# Patient Record
Sex: FEMALE | Race: WHITE | Employment: OTHER | URBAN - METROPOLITAN AREA
[De-identification: names, ages, dates, MRNs, and addresses within clinical notes are randomized per-mention and may not be internally consistent; named-entity substitution may affect disease eponyms.]

---

## 2018-04-17 ENCOUNTER — TELEPHONE (OUTPATIENT)
Dept: ORTHOPEDIC SURGERY | Age: 53
End: 2018-04-17
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
## 2018-04-17 PROBLEM — S61.459A CAT BITE OF HAND, INITIAL ENCOUNTER: Status: ACTIVE | Noted: 2018-04-17

## 2018-05-01 ENCOUNTER — OFFICE VISIT (OUTPATIENT)
Dept: ORTHOPEDIC SURGERY | Age: 53
End: 2018-05-01

## 2018-05-01 VITALS — BODY MASS INDEX: 18.44 KG/M2 | HEIGHT: 64 IN | WEIGHT: 108 LBS
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
## 2018-05-01 DIAGNOSIS — L03.113 CELLULITIS OF RIGHT UPPER EXTREMITY: Primary | ICD-10-CM

## 2018-05-01 DIAGNOSIS — S61.459A CAT BITE OF HAND, INITIAL ENCOUNTER: ICD-10-CM

## 2018-05-01 PROCEDURE — 99024 POSTOP FOLLOW-UP VISIT: CPT | Performed by: ORTHOPAEDIC SURGERY

## 2022-06-16 ENCOUNTER — HOSPITAL ENCOUNTER (OUTPATIENT)
Age: 57
Discharge: HOME | End: 2022-06-16
Payer: MEDICAID

## 2022-06-16 ENCOUNTER — HOSPITAL ENCOUNTER (OUTPATIENT)
Dept: GENERAL RADIOLOGY | Age: 57
Discharge: HOME | End: 2022-06-16
Payer: MEDICAID

## 2022-06-16 DIAGNOSIS — M79.602 LEFT ARM PAIN: ICD-10-CM

## 2022-06-16 PROCEDURE — 73090 X-RAY EXAM OF FOREARM: CPT

## 2022-06-21 ENCOUNTER — OFFICE VISIT (OUTPATIENT)
Dept: ORTHOPEDIC SURGERY | Age: 57
End: 2022-06-21
Payer: MEDICAID

## 2022-06-21 VITALS — WEIGHT: 108 LBS | BODY MASS INDEX: 18.44 KG/M2 | RESPIRATION RATE: 16 BRPM | HEIGHT: 64 IN

## 2022-06-21 DIAGNOSIS — S52.225K: ICD-10-CM

## 2022-06-21 DIAGNOSIS — S49.92XA ARM INJURY, LEFT, INITIAL ENCOUNTER: Primary | ICD-10-CM

## 2022-06-21 PROCEDURE — G8427 DOCREV CUR MEDS BY ELIG CLIN: HCPCS | Performed by: PHYSICIAN ASSISTANT

## 2022-06-21 PROCEDURE — G8420 CALC BMI NORM PARAMETERS: HCPCS | Performed by: PHYSICIAN ASSISTANT

## 2022-06-21 PROCEDURE — L3908 WHO COCK-UP NONMOLDE PRE OTS: HCPCS | Performed by: PHYSICIAN ASSISTANT

## 2022-06-21 PROCEDURE — 3017F COLORECTAL CA SCREEN DOC REV: CPT | Performed by: PHYSICIAN ASSISTANT

## 2022-06-21 PROCEDURE — 99202 OFFICE O/P NEW SF 15 MIN: CPT | Performed by: PHYSICIAN ASSISTANT

## 2022-06-21 PROCEDURE — 4004F PT TOBACCO SCREEN RCVD TLK: CPT | Performed by: PHYSICIAN ASSISTANT

## 2022-06-22 PROBLEM — S49.92XA ARM INJURY, LEFT, INITIAL ENCOUNTER: Status: ACTIVE | Noted: 2022-06-22

## 2022-06-22 PROBLEM — S52.226K: Status: ACTIVE | Noted: 2022-06-22

## 2022-06-22 NOTE — PROGRESS NOTES
Subjective:      Patient ID: Abraham Malone is a 62 y.o. female who presents to the office for an initial evaluation of left forearm pain. She states she sustained a fracture to the left ulna approximately 9 years ago. She was treated with a cast.  She was told her fracture had healed and she was doing fine. 3 weeks ago struck her forearm against a countertop and is now experiencing pain in the mid forearm region. 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 She is here today for further orthopedic evaluation. She is accompanied today with her medical . Pain Scale 9/10 VAS. Location of pain mid shaft left forearm. Pain is worse with activity. Pain improves with rest.   Previous treatments have included Tylenol. Review Of Systems:   Review of Systems:  I have reviewed the clinically relevant past medical history, medications, allergies, family history, social history, and 13 point Review of Systems from the patient's recent history form & documented any details relevant to today's presenting complaints in the history above. The patient's self-reported past medical history, medications, allergies, family history, social history, and Review of Systems form from today's date have been scanned into the chart under the \"Media\" tab. As noted in the HPI. Negative for fever or chills. Negative for poly-joint pain, swelling and stiffness. She denies numbness or tingling into the left upper extremity. Past Medical History:   Diagnosis Date    Depression     Schizophrenia [facility])        History reviewed. No pertinent family history.     Past Surgical History:   Procedure Laterality Date    ABSCESS DRAINAGE      Rt. thumb       Social History     Occupational History    Not on file   Tobacco Use    Smoking status: Current Every Day Smoker     Packs/day: 1.00     Types: Cigarettes    Smokeless tobacco: Never Used   Substance and Sexual Activity    Alcohol use: No    Drug use: No    Sexual activity: Not on file       Current Outpatient Medications   Medication Sig Dispense Refill    acetaminophen (TYLENOL) 325 MG tablet Take 2 tablets by mouth every 6 hours as needed for Pain 120 tablet 3    traZODone (DESYREL) 100 MG tablet Take 100 mg by mouth nightly      fluPHENAZine HCl (PROLIXIN) 10 MG tablet Take 10 mg by mouth nightly       trihexyphenidyl (ARTANE) 5 MG tablet Take 5 mg by mouth 2 times daily.  OLANZapine (ZYPREXA) 15 MG tablet Take 30 mg by mouth nightly        No current facility-administered medications for this visit. Objective:     She is alert, oriented x 3, pleasant, well nourished, developed and in no   acute distress. She appears very unkept with dirty hands and feet. Resp 16   Ht 5' 4\" (1.626 m)   Wt 108 lb (49 kg)   BMI 18.54 kg/m²      Examination of the left forearm:  Skin is intact. She has a lump over the midshaft of the ulna. Assumed to be callus formation. She has tenderness with palpation over this region. She has discomfort in the mid forearm with pronation and supination of the wrist.  She has near full range of motion of the elbow and wrist.      Upper extremities:  She has 5/5 strength of her interosseous muscles, wrist dorsiflexors and volarflexors, biceps, triceps, deltoids, and internal and external rotators of her shoulders, bilaterally. Her biceps, triceps, bracheoradialis, quadriceps and achilles reflexes are 2+, bilaterally. Sensation is intact to light touchfrom C6 to C8. She has no clonus and negative Greene's bilaterally. Examination of the upper extremities shows intact perfusion to all extremities. She has no cyanosis and digigts are warm to touch, capillary refill is less than 2 seconds. She has no edema noted. She has intact skin without lacerations or abrasions, no significant erythema, rashes or skin lesions.              X Rays: were not performed in the office today:   X Rays from [facility] or an outside facility:  Narrative   EXAMINATION:   TWO XRAY VIEWS OF THE LEFT FOREARM       6/16/2022 9:16 am       COMPARISON:   None.       HISTORY:   ORDERING SYSTEM PROVIDED HISTORY: Left arm pain   TECHNOLOGIST PROVIDED HISTORY:   Reason for Exam: left arm pain       FINDINGS:   There is a fracture seen through the mid shaft of the ulna with mild   angulation, and callus formation, but no significant bony bridging.       Degenerative changes seen at the elbow joint, and the trochlear ulnar joint,   and radiocapitellar joint.  There is a small joint effusion.           Impression   Incompletely healed fracture of the ulna.  This could represent nonunion as   there is no significant bony bridging. Rocael Ra  Correlate with time course of injury.       Small joint effusion is seen at the elbow joint.  This may be related to the   degenerative change at the elbow joint rather than indicative of underlying   occult fracture.             Additional Tests reviewed: none  Additional Outside Records reviewed: none    Diagnosis:       ICD-10-CM    1. Arm injury, left, initial encounter  S49. 92XA Amanda Chavez Titan Wrist and Forearm Brace   2. 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 X-rays dated 6/16/2022 appear to be a nonunion of the midshaft transverse fracture of the ulna. Wrist and elbow joint appears stable. I had an extensive discussion with Ms. Vladimir Cortez regarding the natural history, etiology, and long term consequences of her condition. I have presented reasonable alternatives to the patient's proposed care, treatment, and services. Risks and benefits of the treatment options also reviewed in detail. I have outlined a treatment plan with them. She has had full opportunity to ask her questions.   I have answered them all to her satisfaction. I feel that Ms. Nara Tineo understands our discussion today. Discussed discussed surgical and nonsurgical options today. Plan:  Medications-Tylenol for pain    Procedures-we will treat in a long forearm splint. Give this 3 weeks to see if her pain subsides. Procedures    Amanda Chavez Titan Wrist and Forearm Brace     Patient was prescribed a Amanda Chavez Wrist and Forearm Brace. The left hand will require stabilization / immobilization from this semi-rigid / rigid orthosis to improve their function. The orthosis will assist in protecting the affected area, provide functional support and facilitate healing. The patient was educated and fit by a healthcare professional with expert knowledge and specialization in brace application while under the direct supervision of the physician. Verbal and written instructions for the use of and application of this item were provided. They were instructed to contact the office immediately should the brace result in increased pain, decreased sensation, increased swelling or worsening of the condition. Follow up- 3 weeks if still symptomatic. Call or return to clinic if these symptoms worsen or fail to improve as anticipated. The total time spent on today's visit including reviewing test results, history, performance of physical exam, counseling/ education, ordering of medications, tests or procedures was 18 minutes. This time does not include completion of the medical record. This time excludes any time spent performing procedures or tests in the office. Aixa Childress PA-C   Senior Physician Assistant   [facility]                                         Disclaimer: This note was generated with use of a verbal recognition program (DRAGON) and an attempt was made to check for errors.   It is possible that there are still dictated errors within this office note. If so, please bring any significant errors to my attention for an addendum. All efforts were made to ensure that this office note is accurate.

## 2022-07-13 ENCOUNTER — HOSPITAL ENCOUNTER (EMERGENCY)
Age: 57
Discharge: HOME | End: 2022-07-13
Payer: MEDICAID

## 2022-07-13 ENCOUNTER — OFFICE VISIT (OUTPATIENT)
Dept: ORTHOPEDIC SURGERY | Age: 57
End: 2022-07-13

## 2022-07-13 VITALS — BODY MASS INDEX: 18.44 KG/M2 | WEIGHT: 108 LBS | RESPIRATION RATE: 16 BRPM | HEIGHT: 64 IN

## 2022-07-13 VITALS
RESPIRATION RATE: 16 BRPM | HEIGHT: 64 IN | DIASTOLIC BLOOD PRESSURE: 70 MMHG | TEMPERATURE: 97.8 F | WEIGHT: 120 LBS | SYSTOLIC BLOOD PRESSURE: 107 MMHG | BODY MASS INDEX: 20.49 KG/M2 | HEART RATE: 86 BPM | OXYGEN SATURATION: 94 %

## 2022-07-13 DIAGNOSIS — S52.225K: Primary | ICD-10-CM

## 2022-07-13 DIAGNOSIS — L03.211 FACIAL CELLULITIS: ICD-10-CM

## 2022-07-13 DIAGNOSIS — K08.89 PAIN, DENTAL: Primary | ICD-10-CM

## 2022-07-13 PROCEDURE — 99284 EMERGENCY DEPT VISIT MOD MDM: CPT

## 2022-07-13 PROCEDURE — 99212 OFFICE O/P EST SF 10 MIN: CPT | Performed by: PHYSICIAN ASSISTANT

## 2022-07-13 PROCEDURE — 3017F COLORECTAL CA SCREEN DOC REV: CPT | Performed by: PHYSICIAN ASSISTANT

## 2022-07-13 PROCEDURE — 4004F PT TOBACCO SCREEN RCVD TLK: CPT | Performed by: PHYSICIAN ASSISTANT

## 2022-07-13 PROCEDURE — 2500000003 HC RX 250 WO HCPCS: Performed by: PHYSICIAN ASSISTANT

## 2022-07-13 PROCEDURE — G8427 DOCREV CUR MEDS BY ELIG CLIN: HCPCS | Performed by: PHYSICIAN ASSISTANT

## 2022-07-13 PROCEDURE — 96372 THER/PROPH/DIAG INJ SC/IM: CPT

## 2022-07-13 PROCEDURE — G8420 CALC BMI NORM PARAMETERS: HCPCS | Performed by: PHYSICIAN ASSISTANT

## 2022-07-13 PROCEDURE — 6370000000 HC RX 637 (ALT 250 FOR IP): Performed by: PHYSICIAN ASSISTANT

## 2022-07-13 RX ORDER — CLINDAMYCIN PHOSPHATE 150 MG/ML
600 INJECTION, SOLUTION INTRAVENOUS ONCE
Status: COMPLETED | OUTPATIENT
Start: 2022-07-13 | End: 2022-07-13

## 2022-07-13 RX ORDER — IBUPROFEN 600 MG/1
600 TABLET ORAL EVERY 6 HOURS PRN
Qty: 120 TABLET | Refills: 0 | Status: SHIPPED | OUTPATIENT
Start: 2022-07-13

## 2022-07-13 RX ORDER — CLINDAMYCIN HYDROCHLORIDE 300 MG/1
300 CAPSULE ORAL 3 TIMES DAILY
Qty: 21 CAPSULE | Refills: 0 | Status: SHIPPED | OUTPATIENT
Start: 2022-07-13 | End: 2022-07-20

## 2022-07-13 RX ADMIN — CLINDAMYCIN PHOSPHATE 600 MG: 150 INJECTION, SOLUTION INTRAMUSCULAR; INTRAVENOUS at 11:57

## 2022-07-13 RX ADMIN — IBUPROFEN 600 MG: 200 TABLET, FILM COATED ORAL at 11:23

## 2022-07-13 ASSESSMENT — PAIN DESCRIPTION - PAIN TYPE: TYPE: ACUTE PAIN

## 2022-07-13 ASSESSMENT — PAIN DESCRIPTION - ONSET: ONSET: ON-GOING

## 2022-07-13 ASSESSMENT — PAIN DESCRIPTION - LOCATION
LOCATION: MOUTH
LOCATION: MOUTH

## 2022-07-13 ASSESSMENT — PAIN DESCRIPTION - FREQUENCY: FREQUENCY: CONTINUOUS

## 2022-07-13 ASSESSMENT — PAIN SCALES - GENERAL
PAINLEVEL_OUTOF10: 7
PAINLEVEL_OUTOF10: 7

## 2022-07-13 ASSESSMENT — PAIN DESCRIPTION - DESCRIPTORS
DESCRIPTORS: ACHING
DESCRIPTORS: ACHING

## 2022-07-13 ASSESSMENT — PAIN DESCRIPTION - ORIENTATION
ORIENTATION: RIGHT
ORIENTATION: RIGHT

## 2022-07-13 ASSESSMENT — PAIN - FUNCTIONAL ASSESSMENT
PAIN_FUNCTIONAL_ASSESSMENT: 0-10
PAIN_FUNCTIONAL_ASSESSMENT: ACTIVITIES ARE NOT PREVENTED

## 2022-07-13 NOTE — PROGRESS NOTES
Subjective:      Patient ID: Melissa Flor is a 62 y.o. 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 History of remote forearm fracture when she was 5years old. Treated in cast.      Approximately 7 weeks ago struck her arm against a countertop and developed increased pain. X-rays on 6/16/2022 demonstrated nonunion of her previous ulnar fracture. Since 6/21/2022 she has been wearing long forearm splint and reports significant improvement of her left forearm pain. Review Of Systems:   Negative for fever or chills. Past Medical History:   Diagnosis Date    Depression     Schizophrenia [facility])        History reviewed. No pertinent family history. Past Surgical History:   Procedure Laterality Date    ABSCESS DRAINAGE      Rt. thumb       Social History     Occupational History    Not on file   Tobacco Use    Smoking status: Current Every Day Smoker     Packs/day: 1.00     Types: Cigarettes    Smokeless tobacco: Never Used   Substance and Sexual Activity    Alcohol use: No    Drug use: No    Sexual activity: Not on file       Current Outpatient Medications   Medication Sig Dispense Refill    acetaminophen (TYLENOL) 325 MG tablet Take 2 tablets by mouth every 6 hours as needed for Pain 120 tablet 3    traZODone (DESYREL) 100 MG tablet Take 100 mg by mouth nightly      fluPHENAZine HCl (PROLIXIN) 10 MG tablet Take 10 mg by mouth nightly       trihexyphenidyl (ARTANE) 5 MG tablet Take 5 mg by mouth 2 times daily.  OLANZapine (ZYPREXA) 15 MG tablet Take 30 mg by mouth nightly        No current facility-administered medications for this visit. Objective:     She is alert, oriented x 3, pleasant, well nourished, developed and in no   acute distress. Resp 16   Ht 5' 4\" (1.626 m)   Wt 108 lb (49 kg)   BMI 18.54 kg/m²      She has minimal tenderness with palpation over the mid forearm.       X Rays: performed in the office today: AP and lateral radius and ulna shows what appears to be a healing fracture of a previous nonunion mid ulna. No angulation or displacement. Diagnosis:       ICD-10-CM    1. 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 I had an extensive discussion with Ms. Sarah Medina regarding the natural history, etiology, and long term consequences of her condition. I have presented reasonable alternatives to the patient's proposed care, treatment, and services. Risks and benefits of the treatment options also reviewed in detail. I have outlined a treatment plan with them. She has had full opportunity to ask her questions. I have answered them all to her satisfaction. I feel that Ms. Sarah Medina understands our discussion today. Plan:    Procedures-recommend she continue wearing a long forearm splint for an additional 4 weeks. Rest, Ice, Compression and Elevation  Activity restriction/ Modification discussed. OTC NSAIDS discussed. 1. The most common side effects from NSAIDs are stomachaches, heartburn, and nausea. NSAIDs may irritate the stomach lining. If the medicine upsets your stomach, you can try taking it with food. But if that doesn't help, talk with your doctor to make sure it's not a more serious problem, such as a stomach ulcer or bleeding in the stomach or intestines. 2. Using NSAIDs may:  ? Lead to high blood pressure. ? Make symptoms of heart failure worse. ? Raise the risk of heart attack, stroke, kidney damage, and skin reactions. 3. Your risks are greater if you take NSAIDs at higher doses or for longer than the label says. People who are older than 72 or who have heart, stomach, or intestinal disease have a higher risk for problems. Follow [id number] in 4 weeks.   Call or return to clinic if these symptoms worsen or fail to improve as anticipated. The total time spent on today's visit including reviewing test results, history, performance of physical exam, counseling/ education, ordering of medications, tests or procedures was 12 minutes. This time does include completion of the medical record. This time excludes any time spent performing procedures or tests in the office. Franky Whitmore PA-C   Senior Physician Assistant   [facility]                                         Disclaimer: This note was generated with use of a verbal recognition program (DRAGON) and an attempt was made to check for errors. It is possible that there are still dictated errors within this office note. If so, please bring any significant errors to my attention for an addendum. All efforts were made to ensure that this office note is accurate.

## 2022-07-13 NOTE — ED PROVIDER NOTES
**ADVANCED PRACTICE PROVIDER, I HAVE EVALUATED THIS PATIENT**        [address] [facility]3] ENCOUNTER      Pt Name: Jacque WHITAKER:[id number]  [place] 1965  Date of evaluation: 7/13/2022  Provider: Jonelle Valdez PA-C      Chief Complaint:    Chief Complaint   Patient presents with    Dental Pain     patient has swelling to Rt side of her mouth for 2 days          Nursing Notes, Past Medical Hx, Past Surgical Hx, Social Hx, Allergies, and Family Hx were all reviewed and agreed with or any disagreements were addressed in the HPI.    HPI: (Location, Duration, Timing, Severity, Quality, Assoc Sx, Context, Modifying factors)    Chief Complaint of dental pain    This is a  62 y.o. female who presents stating that she has some pain and tenderness in the right lower jaw around some infected teeth. She states that she knows she has a couple of teeth there that are bad and they do hurt. She has noticed that things have gotten worse over the last couple of days. She has not seen a dentist about this. Patient states that she given for help. Pain is local and constant in that face to jaw area, worse with pressure on the affected teeth and better at rest.  No difficulty swallowing or breathing. No fevers or chills cough or congestion. No other acute complaint according to patient. No medication taken for this so far today.     PastMedical/Surgical History:      Diagnosis Date    Depression     Schizophrenia ([facility] [address]          Procedure Laterality Date    ABSCESS DRAINAGE      Rt. thumb       Medications:  Previous Medications    ACETAMINOPHEN (TYLENOL) 325 MG TABLET    Take 2 tablets by mouth every 6 hours as needed for Pain    FLUPHENAZINE HCL (PROLIXIN) 10 MG TABLET    Take 10 mg by mouth nightly     OLANZAPINE (ZYPREXA) 15 MG TABLET    Take 30 mg by mouth nightly     TRAZODONE (DESYREL) 100 MG TABLET    Take 100 mg by mouth nightly    TRIHEXYPHENIDYL (ARTANE) 5 MG TABLET    Take 5 mg by mouth 2 times daily. Review of Systems:  (2-9 systems needed)  Review of Systems  Positive history as above with swelling pain and redness in the right cheek to mouth area, also a couple of tender teeth right lower anterolateral, worse with pushing on them and better at rest.  No mouth or throat swelling or difficulty swallowing. No shortness of breath or chest pain or abdominal pain fevers or chills extremity pain or other acute complaint. \"Positives and Pertinent negatives as per HPI\"    Physical Exam:  Physical Exam  Vitals and nursing note reviewed. Constitutional:       General: She is not in acute distress. Appearance: Normal appearance. She is not ill-appearing, toxic-appearing or diaphoretic. HENT:      Head: Normocephalic and atraumatic. Right Ear: External ear normal.      Left Ear: External ear normal.      Nose: Nose normal.      Mouth/Throat:      Mouth: Mucous membranes are moist.      Pharynx: No posterior oropharyngeal erythema. Eyes:      General:         Right eye: No discharge. Left eye: No discharge. Conjunctiva/sclera: Conjunctivae normal.   Cardiovascular:      Rate and Rhythm: Normal rate and regular rhythm. Pulses: Normal pulses. Heart sounds: Normal heart sounds. No murmur heard. No gallop. Pulmonary:      Effort: Pulmonary effort is normal. No respiratory distress. Breath sounds: Normal breath sounds. No wheezing, rhonchi or rales. Musculoskeletal:      Cervical back: Normal range of motion and neck supple. No rigidity or tenderness. Lymphadenopathy:      Cervical: No cervical adenopathy. Skin:     General: Skin is warm and dry. Capillary Refill: Capillary refill takes less than 2 seconds. Findings: Erythema present. Neurological:      Mental Status: She is alert and oriented to person, place, and time. Mental status is at baseline.    Psychiatric:         Mood and Affect: Mood normal. Behavior: Behavior normal.         MEDICAL DECISION MAKING    Vitals:    Vitals:    07/13/22 1120   BP: 107/70   Pulse: 86   Resp: 16   Temp: 97.8 °F (36.6 °C)   TempSrc: Oral   SpO2: 94%   Weight: 120 lb (54.4 kg)   Height: 5' 4\" (1.626 m)       LABS:Labs Reviewed - No data to display     Remainder of labs reviewed and were negative at this time or not returned at the time of this note. RADIOLOGY:   Non-plain film images such as CT, Ultrasound and MRI are read by the radiologist. Kim Ramírez PA-C have directly visualized the radiologic plain film image(s) with the below findings:      Interpretation per the Radiologist below, if available at the time of this note:    No orders to display        XR RADIUS ULNA LEFT (2 VIEWS)    Result Date: 7/13/2022  Radiology exam is complete. No Radiologist dictation. Please follow up with ordering provider. XR RADIUS ULNA LEFT (2 VIEWS)    Result Date: 6/16/2022  EXAMINATION: TWO XRAY VIEWS OF THE LEFT FOREARM 6/16/2022 9:16 am COMPARISON: None. HISTORY: ORDERING SYSTEM PROVIDED HISTORY: Left arm pain TECHNOLOGIST PROVIDED HISTORY: Reason for Exam: left arm pain FINDINGS: There is a fracture seen through the mid shaft of the ulna with mild angulation, and callus formation, but no significant bony bridging. Degenerative changes seen at the elbow joint, and the trochlear ulnar joint, and radiocapitellar joint. There is a small joint effusion. Incompletely healed fracture of the ulna. This could represent nonunion as there is no significant bony bridging. .  Correlate with time course of injury. Small joint effusion is seen at the elbow joint. This may be related to the degenerative change at the elbow joint rather than indicative of underlying occult fracture.           MEDICAL DECISION MAKING / ED COURSE:      PROCEDURES:   Procedures    None    Patient was given:  Medications   clindamycin (CLEOCIN) injection 600 mg (has no administration in time range) ibuprofen (ADVIL;MOTRIN) tablet 600 mg (600 mg Oral Given 7/13/22 1123)   This patient presents as above and evaluation and treatment is begun here. She does have the cellulitis associated with the dental disease as described above. Patient has no fever or tachycardia. She needs antibiotics as well as medication for pain and these will be given here. We also discussed and I offered blood work as well as CT of the face but she states that she does not want this at this time but can come back if she feels that all any worse. No throat swelling or posterior oropharyngeal erythema or edema or difficulty taking fluids or breathing. We will give an injection of clindamycin here and also provide some ibuprofen. Otherwise conservative home care is agreed upon including the following discharge home instructions. Drink a lot of water through the day, elevate head and shoulders at rest to help keep swelling from getting worse, and continue the antibiotics and medication for pain as written. Call your dentist to arrange next available outpatient visit for further care and treatment. Return to the emergency department for any unexplained fever, significantly worse pain or redness or swelling, unable to take liquids or difficulty breathing, or other emergent worsening or concern. The patient tolerated their visit well. I evaluated the patient. The physician was available for consultation as needed. The patient and / or the family were informed of the results of any tests, a time was given to answer questions, a plan was proposed and they agreed with plan. CLINICAL IMPRESSION:  1. Pain, dental    2.  Facial cellulitis        DISPOSITION Decision To Discharge 07/13/2022 11:51:32 AM      PATIENT REFERRED TO:   [facility]  [address] · [place], [place] · [phone]   Open 24 hours     [facility]   [address] · Oral surgeon   [place] · [phone]  Call         DISCHARGE MEDICATIONS:  New Prescriptions    CLINDAMYCIN (CLEOCIN) 300 MG CAPSULE    Take 1 capsule by mouth 3 times daily for 7 days    IBUPROFEN (IBU) 600 MG TABLET    Take 1 tablet by mouth every 6 hours as needed for Pain       DISCONTINUED MEDICATIONS:  Discontinued Medications    No medications on file              (Please note the MDM and HPI sections of this note were completed with a voice recognition program.  Efforts were made to edit the dictations but occasionally words are mis-transcribed.)    Electronically signed, Clint Rich PA-C,           Clint Rich PA-C  07/13/22 3254